# Patient Record
Sex: FEMALE | Race: OTHER | HISPANIC OR LATINO | URBAN - METROPOLITAN AREA
[De-identification: names, ages, dates, MRNs, and addresses within clinical notes are randomized per-mention and may not be internally consistent; named-entity substitution may affect disease eponyms.]

---

## 2021-11-09 ENCOUNTER — EMERGENCY (EMERGENCY)
Facility: HOSPITAL | Age: 26
LOS: 1 days | Discharge: HOME | End: 2021-11-09
Attending: STUDENT IN AN ORGANIZED HEALTH CARE EDUCATION/TRAINING PROGRAM
Payer: MEDICAID

## 2021-11-09 VITALS
DIASTOLIC BLOOD PRESSURE: 84 MMHG | TEMPERATURE: 99 F | SYSTOLIC BLOOD PRESSURE: 119 MMHG | OXYGEN SATURATION: 100 % | WEIGHT: 119.93 LBS | RESPIRATION RATE: 20 BRPM | HEART RATE: 89 BPM | HEIGHT: 59 IN

## 2021-11-09 PROCEDURE — 99284 EMERGENCY DEPT VISIT MOD MDM: CPT

## 2021-11-09 PROCEDURE — 71101 X-RAY EXAM UNILAT RIBS/CHEST: CPT

## 2021-11-09 PROCEDURE — 99283 EMERGENCY DEPT VISIT LOW MDM: CPT | Mod: 25

## 2021-11-09 PROCEDURE — 71101 X-RAY EXAM UNILAT RIBS/CHEST: CPT | Mod: 26

## 2021-11-09 RX ORDER — ACETAMINOPHEN 500 MG
975 TABLET ORAL ONCE
Refills: 0 | Status: COMPLETED | OUTPATIENT
Start: 2021-11-09 | End: 2021-11-09

## 2021-11-09 RX ADMIN — Medication 975 MILLIGRAM(S): at 22:00

## 2021-11-09 NOTE — ED PROVIDER NOTE - CLINICAL SUMMARY MEDICAL DECISION MAKING FREE TEXT BOX
ROGELIO Sanchez, Attending: seen with ACP and reviewed note.    Healthy female, L sided rib pain s/p fall from slipping on rock yesterday. No head trauma. Pain to L ribs, worse with breathing. No back or abd pain. Ambulatory. No AC meds. No dizziness or lightheadedness.     Looks well. No tachypnea or hypoxia. L sided chest wall ttp without ecchymosis, crepitus, or deformity. Able to take full breath. Trachea midline. No midline vert ttp. Abd soft. PERRL. Non lateralizing neuro exam.    Suspect rib contusion. Given sub acute nature and normal resp status doubt major bony trauma. Screening radiographs. No indications for cross sectional imaging. Suspect outpatient management with otc analgesia and IS.

## 2021-11-09 NOTE — ED PROVIDER NOTE - NSFOLLOWUPINSTRUCTIONS_ED_ALL_ED_FT
- stay hydrated.   - take tylenol 975mg and ibuprofen 600mg every 6 hours as needed for pain-take with meals.  -use lidocaine patches every 12 hours as needed for pain  -Use incentive spirometer as instructed  - follow up with your primary care provider in 1-2 days.  - return if symptoms worsen, fever, weakness, cough, shortness of breath, difficulty breathing, numbness/tingling, blurred vision, difficulty ambulating and all other concerns.

## 2021-11-09 NOTE — ED ADULT NURSE NOTE - OBJECTIVE STATEMENT
25 yo F with no reported pmhx presents with l rib pain s/p fall yesterday. 27 yo F with no reported pmhx presents with L anterior rib pain s/p fall yesterday. Pt was standing on an large unstable rock when she fell off landing on another rock injury her left side. Pt denies other injuries, head injuries or LOC. Pt denies radiation of pain, fevers/chills, SOB. abd soft, nt/nd, skin intact, lungs CTA.

## 2021-11-09 NOTE — ED PROVIDER NOTE - PATIENT PORTAL LINK FT
You can access the FollowMyHealth Patient Portal offered by [facility] by registering at the following website: [email or web address]. By joining [facility]’s FollowMyHealth portal, you will also be able to view your health information using other applications (apps) compatible with our system.

## 2021-11-09 NOTE — ED PROVIDER NOTE - NS ED ROS FT
Constitutional: No fever or chills  CV: No chest pain or lower extremity edema  Resp: No SOB no cough  GI: No abd pain. No nausea or vomiting.  MSK: No musculoskeletal pain  Skin: No rash/abrasions  Neuro: No headache. No numbness or tingling. No weakness.

## 2021-11-09 NOTE — ED PROVIDER NOTE - PHYSICAL EXAMINATION
A&Ox3, NAD.  NCAT. PERRL, EOMI.  Neck supple, no LAD.   Lungs CTAB. No w/r/r  Cardiac +S1S2, RRR, No m/r/g. +TTP L anterior ribs, no overlaying ecchymosis, swelling, crepitus.   Abd soft, NT/ND, +BS, no rebound or guarding.   Extremities: cap refill <2, pulses in distal extremities 4+, no edema.   Skin without rash.   CN II-XII intact. Strength 5/5 UE/LE. Sensations intact throughout. Gait steady.

## 2024-06-10 PROBLEM — Z00.00 ENCOUNTER FOR PREVENTIVE HEALTH EXAMINATION: Status: ACTIVE | Noted: 2024-06-10

## 2024-07-02 ENCOUNTER — APPOINTMENT (OUTPATIENT)
Dept: HEART AND VASCULAR | Facility: CLINIC | Age: 29
End: 2024-07-02

## 2024-11-02 NOTE — ED PROVIDER NOTE - NS ED MD DISPO DISCHARGE
Home Treatment Goal Met?: yes Treatment Goal Explanation (Does Not Render In The Note): Stable for the purposes of categorizing medical decision making is defined by the specific treatment goals for an individual patient. A patient that is not at their treatment goal is not stable, even if the condition has not changed and there is no short- term threat to life or function.